# Patient Record
Sex: MALE | Race: WHITE | NOT HISPANIC OR LATINO | Employment: OTHER | URBAN - METROPOLITAN AREA
[De-identification: names, ages, dates, MRNs, and addresses within clinical notes are randomized per-mention and may not be internally consistent; named-entity substitution may affect disease eponyms.]

---

## 2018-09-07 ENCOUNTER — OFFICE VISIT (OUTPATIENT)
Dept: INTERNAL MEDICINE | Facility: CLINIC | Age: 58
End: 2018-09-07

## 2018-09-07 VITALS
BODY MASS INDEX: 21.18 KG/M2 | DIASTOLIC BLOOD PRESSURE: 100 MMHG | SYSTOLIC BLOOD PRESSURE: 170 MMHG | WEIGHT: 143 LBS | HEIGHT: 69 IN | TEMPERATURE: 98.4 F

## 2018-09-07 DIAGNOSIS — Z71.6 ENCOUNTER FOR SMOKING CESSATION COUNSELING: ICD-10-CM

## 2018-09-07 DIAGNOSIS — R39.9 LOWER URINARY TRACT SYMPTOMS (LUTS): ICD-10-CM

## 2018-09-07 DIAGNOSIS — E78.49 OTHER HYPERLIPIDEMIA: ICD-10-CM

## 2018-09-07 DIAGNOSIS — I10 ESSENTIAL HYPERTENSION: Primary | ICD-10-CM

## 2018-09-07 DIAGNOSIS — N52.9 ERECTILE DYSFUNCTION, UNSPECIFIED ERECTILE DYSFUNCTION TYPE: ICD-10-CM

## 2018-09-07 DIAGNOSIS — F32.A DEPRESSION, UNSPECIFIED DEPRESSION TYPE: ICD-10-CM

## 2018-09-07 DIAGNOSIS — Z86.711 HISTORY OF PULMONARY EMBOLUS (PE): ICD-10-CM

## 2018-09-07 DIAGNOSIS — R07.9 CHEST PAIN, UNSPECIFIED TYPE: ICD-10-CM

## 2018-09-07 DIAGNOSIS — M15.9 PRIMARY OSTEOARTHRITIS INVOLVING MULTIPLE JOINTS: ICD-10-CM

## 2018-09-07 DIAGNOSIS — J43.8 OTHER EMPHYSEMA (HCC): ICD-10-CM

## 2018-09-07 PROBLEM — F11.21 OPIOID DEPENDENCE IN REMISSION (HCC): Status: ACTIVE | Noted: 2018-09-07

## 2018-09-07 PROBLEM — K57.30 DIVERTICULOSIS OF LARGE INTESTINE WITHOUT HEMORRHAGE: Status: ACTIVE | Noted: 2018-09-07

## 2018-09-07 PROCEDURE — 99204 OFFICE O/P NEW MOD 45 MIN: CPT | Performed by: INTERNAL MEDICINE

## 2018-09-07 RX ORDER — VARENICLINE TARTRATE 1 MG/1
1 TABLET, FILM COATED ORAL 2 TIMES DAILY
Qty: 60 TABLET | Refills: 2 | Status: SHIPPED | OUTPATIENT
Start: 2018-09-07

## 2018-09-07 RX ORDER — ATORVASTATIN CALCIUM 40 MG/1
40 TABLET, FILM COATED ORAL DAILY
Qty: 30 TABLET | Refills: 2 | Status: SHIPPED | OUTPATIENT
Start: 2018-09-07 | End: 2018-12-18 | Stop reason: SDUPTHER

## 2018-09-07 RX ORDER — ALBUTEROL SULFATE 90 UG/1
2 AEROSOL, METERED RESPIRATORY (INHALATION) EVERY 4 HOURS PRN
Qty: 8 G | Refills: 11 | Status: SHIPPED | OUTPATIENT
Start: 2018-09-07

## 2018-09-07 RX ORDER — BUDESONIDE AND FORMOTEROL FUMARATE DIHYDRATE 160; 4.5 UG/1; UG/1
2 AEROSOL RESPIRATORY (INHALATION)
Qty: 10.2 G | Refills: 12 | Status: SHIPPED | OUTPATIENT
Start: 2018-09-07

## 2018-09-07 RX ORDER — CYCLOBENZAPRINE HCL 10 MG
10 TABLET ORAL 3 TIMES DAILY PRN
Qty: 90 TABLET | Refills: 2 | Status: SHIPPED | OUTPATIENT
Start: 2018-09-07

## 2018-09-07 RX ORDER — LISINOPRIL 40 MG/1
40 TABLET ORAL DAILY
Qty: 30 TABLET | Refills: 2 | Status: SHIPPED | OUTPATIENT
Start: 2018-09-07 | End: 2018-12-18 | Stop reason: SDUPTHER

## 2018-09-07 RX ORDER — ASPIRIN 81 MG/1
81 TABLET ORAL DAILY
Qty: 30 TABLET | Refills: 2 | COMMUNITY
Start: 2018-09-07

## 2018-09-07 NOTE — PROGRESS NOTES
"Jones Bennett is a 58 y.o. male here to establish care for HTN, HLD, COPD, hx multiple PEs. Has been lost to f/u and has not taken any meds in years. He doesn't feel well overall. HTN: was on lisinopril but doesn't remember dose. BP has been high a long time, he says his elevated reading today is \"good\" and his systolic is generally ~200s. HLD: was on statin, stopped taking it as well. COPD: having RHOADES. Was on symbicort and proair in the past. PEs: this history is a little foggy. About 2-3 years ago he had \"PE in both lungs.\" He has never had PE or DVT any other time. He was on eliquis long term for this but also stopped taking that. Was seeing Dr. Savage at [facility] for mgmt of this (?) but pt denies any arrhythmia, MI, or other heart history. He does have hx of opiate addiction and \"lost a few years of his life\" but ended up in suboxone clinic and has been clean and even off suboxone for a year. He suffers from ED, depression, CP, and a variety of other things. CP is present when he lays down, across the bottom of his ribs. No CP otherwise.      Review of Systems   Constitutional: Positive for fatigue.   HENT: Negative.    Eyes: Negative.    Respiratory: Positive for cough, chest tightness and shortness of breath.    Cardiovascular: Positive for chest pain. Negative for palpitations and leg swelling.   Gastrointestinal: Negative.    Endocrine: Negative.    Genitourinary:        ED   Musculoskeletal: Positive for arthralgias and myalgias.   Skin: Negative.    Allergic/Immunologic: Negative.    Neurological: Negative.    Hematological: Negative.    Psychiatric/Behavioral: Positive for dysphoric mood and sleep disturbance.       Past Medical History:   Diagnosis Date   • Arthritis    • Depression    • GERD (gastroesophageal reflux disease)      Family History   Problem Relation Age of Onset   • Hyperlipidemia Mother    • Hypertension Mother    • Hypertension Father    • Diabetes Brother      Past Surgical " "History:   Procedure Laterality Date   • COLON SURGERY  2005   • HERNIA REPAIR  2005     Social History     Social History   • Marital status: Unknown     Spouse name: N/A   • Number of children: N/A   • Years of education: N/A     Occupational History   • Not on file.     Social History Main Topics   • Smoking status: Current Every Day Smoker     Packs/day: 1.00   • Smokeless tobacco: Never Used   • Alcohol use 1.8 oz/week     3 Cans of beer per week   • Drug use: No      Comment: former use   • Sexual activity: Not on file     Other Topics Concern   • Not on file     Social History Narrative   • No narrative on file       No current outpatient prescriptions on file.    Objective   /100 (BP Location: Left arm, Patient Position: Sitting, Cuff Size: Adult)   Temp 98.4 °F (36.9 °C) (Temporal Artery )   Ht 175.3 cm (69\")   Wt 64.9 kg (143 lb)   BMI 21.12 kg/m²   Physical Exam   Constitutional: He is oriented to person, place, and time. He appears well-developed and well-nourished.   HENT:   Head: Normocephalic and atraumatic.   Nose: Nose normal.   Eyes: Conjunctivae are normal.   Cardiovascular: Normal rate, regular rhythm and normal heart sounds.  Exam reveals no gallop and no friction rub.    No murmur heard.  Pulmonary/Chest: Effort normal and breath sounds normal. No respiratory distress. He has no wheezes. He has no rales.   Musculoskeletal:   Normal gait and station   Neurological: He is alert and oriented to person, place, and time.   Skin: Skin is warm and dry.   Psychiatric: He has a normal mood and affect. His behavior is normal. Judgment and thought content normal.   Vitals reviewed.      Assessment/Plan   Maycol was seen today for establish care. Seemingly very complex medical pt with very limited history. Get records from [facility] and go from there. Will get basic labs, start some meds today. Will hold on eliquis until I have more info. Instructed pt to take asa 81mg (gave sample bottle) until I " get these records. Will discuss depression, ED, next visit.    Diagnoses and all orders for this visit:    Essential hypertension  -     lisinopril (PRINIVIL,ZESTRIL) 40 MG tablet; Take 1 tablet by mouth Daily.    Other hyperlipidemia  -     atorvastatin (LIPITOR) 40 MG tablet; Take 1 tablet by mouth Daily.    Depression, unspecified depression type    History of pulmonary embolus (PE)  -     aspirin 81 MG EC tablet; Take 1 tablet by mouth Daily.    Other emphysema (CMS/HCC)  -     albuterol (PROVENTIL HFA;VENTOLIN HFA) 108 (90 Base) MCG/ACT inhaler; Inhale 2 puffs Every 4 (Four) Hours As Needed for Wheezing.  -     budesonide-formoterol (SYMBICORT) 160-4.5 MCG/ACT inhaler; Inhale 2 puffs 2 (Two) Times a Day.    Chest pain, unspecified type  -     Treadmill Stress Test    Primary osteoarthritis involving multiple joints  -     cyclobenzaprine (FLEXERIL) 10 MG tablet; Take 1 tablet by mouth 3 (Three) Times a Day As Needed for Muscle Spasms.    Lower urinary tract symptoms (LUTS)  -     Ambulatory Referral to Urology    Erectile dysfunction, unspecified erectile dysfunction type  -     Ambulatory Referral to Urology    Encounter for smoking cessation counseling  -     varenicline (CHANTIX STARTING MONTH ADRIÁN) 0.5 MG X 11 & 1 MG X 42 tablet; Take 0.5 mg one daily on days 1-3 and and 0.5 mg twice daily on days 4-7.Then 1 mg twice daily for a total of 12 weeks.  -     varenicline (CHANTIX CONTINUING MONTH ADRIÁN) 1 MG tablet; Take 1 tablet by mouth 2 (Two) Times a Day.

## 2018-09-21 ENCOUNTER — OFFICE VISIT (OUTPATIENT)
Dept: INTERNAL MEDICINE | Facility: CLINIC | Age: 58
End: 2018-09-21

## 2018-09-21 VITALS
DIASTOLIC BLOOD PRESSURE: 68 MMHG | TEMPERATURE: 97.6 F | SYSTOLIC BLOOD PRESSURE: 118 MMHG | BODY MASS INDEX: 21.8 KG/M2 | WEIGHT: 147.2 LBS | HEIGHT: 69 IN

## 2018-09-21 DIAGNOSIS — G89.29 CHRONIC NECK PAIN: ICD-10-CM

## 2018-09-21 DIAGNOSIS — M54.2 CHRONIC NECK PAIN: ICD-10-CM

## 2018-09-21 DIAGNOSIS — Z23 NEED FOR STREPTOCOCCUS PNEUMONIAE VACCINATION: ICD-10-CM

## 2018-09-21 DIAGNOSIS — I10 ESSENTIAL HYPERTENSION: Primary | ICD-10-CM

## 2018-09-21 DIAGNOSIS — M43.6 NECK STIFFNESS: ICD-10-CM

## 2018-09-21 DIAGNOSIS — Z23 INFLUENZA VACCINE ADMINISTERED: ICD-10-CM

## 2018-09-21 DIAGNOSIS — E78.49 OTHER HYPERLIPIDEMIA: ICD-10-CM

## 2018-09-21 DIAGNOSIS — F51.04 CHRONIC INSOMNIA: ICD-10-CM

## 2018-09-21 PROCEDURE — 90670 PCV13 VACCINE IM: CPT | Performed by: INTERNAL MEDICINE

## 2018-09-21 PROCEDURE — 90686 IIV4 VACC NO PRSV 0.5 ML IM: CPT | Performed by: INTERNAL MEDICINE

## 2018-09-21 PROCEDURE — 90471 IMMUNIZATION ADMIN: CPT | Performed by: INTERNAL MEDICINE

## 2018-09-21 PROCEDURE — 90472 IMMUNIZATION ADMIN EACH ADD: CPT | Performed by: INTERNAL MEDICINE

## 2018-09-21 PROCEDURE — 99214 OFFICE O/P EST MOD 30 MIN: CPT | Performed by: INTERNAL MEDICINE

## 2018-09-21 RX ORDER — TRAZODONE HYDROCHLORIDE 50 MG/1
50-150 TABLET ORAL NIGHTLY
Qty: 21 TABLET | Refills: 0 | Status: SHIPPED | OUTPATIENT
Start: 2018-09-21 | End: 2018-09-24 | Stop reason: SDUPTHER

## 2018-09-21 NOTE — PROGRESS NOTES
"Subjective   Maycol Bennett is a 58 y.o. male here for follow-up HTN, HLD, chronic neck pain, insomnia. HTN: taking BP med, has noticed some constipation but otherwise doing well. Feels symptomatically better. BP is much better. HLD: taking statin now. Had FLP last visit which showed uncontrolled LDL. Neck pain: has hx of herniated disc in neck and DJD. Neck feels very stiff and he will get a \"knot\" on the back of his neck some days. No injury. It significantly impacts his quality of life. Pain can be 10/10. Today it is about a 4. Insomnia: on no med for this, has difficulty falling and staying asleep.     The following portions of the patient's history were reviewed and updated as appropriate: allergies, current medications, past medical history, past social history and problem list.    Review of Systems:  General: fatigue improved  CV: negative  Respiratory: negative  Neuro: negative  Psych: insomnia  MSK: neck pain    Objective   /68 (BP Location: Left arm, Patient Position: Sitting, Cuff Size: Adult)   Temp 97.6 °F (36.4 °C) (Temporal Artery )   Ht 175.3 cm (69\")   Wt 66.8 kg (147 lb 3.2 oz)   BMI 21.74 kg/m²     Physical Exam   Constitutional: He is oriented to person, place, and time. He appears well-developed and well-nourished.   Cardiovascular: Normal rate, regular rhythm and normal heart sounds.    Pulmonary/Chest: Effort normal and breath sounds normal. He has no wheezes. He has no rales.   Neurological: He is alert and oriented to person, place, and time.   Skin: Skin is warm and dry.   Psychiatric: He has a normal mood and affect. His behavior is normal. Thought content normal.   Vitals reviewed.      Assessment/Plan   Maycol was seen today for hypertension. Still need records on eliquis. Will continue to hold until I get records on this and pt to continue asa 81mg until then    Diagnoses and all orders for this visit:    Essential hypertension  -much better control and pt feels " symptomatically better    Chronic neck pain  -     XR Spine Cervical 2 or 3 View    Neck stiffness  -     XR Spine Cervical 2 or 3 View    Need for Streptococcus pneumoniae vaccination  -     Pneumococcal Conjugate Vaccine 13-Valent All (PCV13)    Influenza vaccine administered  -     Fluarix/Fluzone/Afluria Quad>6 mo ([id number])    Chronic insomnia  -     traZODone (DESYREL) 50 MG tablet; Take 1-3 tablets by mouth Every Night. New med, pt to call with dose that is effective. Warned of priapism SE.    HLD  -continue statin, recheck FLP 3 mos

## 2018-09-24 DIAGNOSIS — F51.04 CHRONIC INSOMNIA: ICD-10-CM

## 2018-09-24 RX ORDER — TRAZODONE HYDROCHLORIDE 50 MG/1
TABLET ORAL
Qty: 21 TABLET | Refills: 0 | Status: SHIPPED | OUTPATIENT
Start: 2018-09-24

## 2018-10-10 ENCOUNTER — HOSPITAL ENCOUNTER (OUTPATIENT)
Dept: CARDIOLOGY | Facility: HOSPITAL | Age: 58
Discharge: HOME | End: 2018-10-10

## 2018-12-18 DIAGNOSIS — I10 ESSENTIAL HYPERTENSION: ICD-10-CM

## 2018-12-18 DIAGNOSIS — E78.49 OTHER HYPERLIPIDEMIA: ICD-10-CM

## 2018-12-19 RX ORDER — ATORVASTATIN CALCIUM 40 MG/1
TABLET, FILM COATED ORAL
Qty: 30 TABLET | Refills: 1 | Status: SHIPPED | OUTPATIENT
Start: 2018-12-19

## 2018-12-19 RX ORDER — LISINOPRIL 40 MG/1
TABLET ORAL
Qty: 30 TABLET | Refills: 1 | Status: SHIPPED | OUTPATIENT
Start: 2018-12-19